# Patient Record
Sex: FEMALE | Race: OTHER
[De-identification: names, ages, dates, MRNs, and addresses within clinical notes are randomized per-mention and may not be internally consistent; named-entity substitution may affect disease eponyms.]

---

## 2019-02-21 ENCOUNTER — HOSPITAL ENCOUNTER (OUTPATIENT)
Dept: HOSPITAL 61 - SURG | Age: 44
Setting detail: OBSERVATION
LOS: 1 days | Discharge: HOME | End: 2019-02-22
Attending: OBSTETRICS & GYNECOLOGY | Admitting: OBSTETRICS & GYNECOLOGY
Payer: COMMERCIAL

## 2019-02-21 VITALS — SYSTOLIC BLOOD PRESSURE: 113 MMHG | DIASTOLIC BLOOD PRESSURE: 62 MMHG

## 2019-02-21 VITALS — WEIGHT: 192 LBS | HEIGHT: 63 IN | BODY MASS INDEX: 34.02 KG/M2

## 2019-02-21 VITALS — DIASTOLIC BLOOD PRESSURE: 57 MMHG | SYSTOLIC BLOOD PRESSURE: 107 MMHG

## 2019-02-21 VITALS — DIASTOLIC BLOOD PRESSURE: 66 MMHG | SYSTOLIC BLOOD PRESSURE: 102 MMHG

## 2019-02-21 VITALS — DIASTOLIC BLOOD PRESSURE: 68 MMHG | SYSTOLIC BLOOD PRESSURE: 110 MMHG

## 2019-02-21 DIAGNOSIS — Z98.890: ICD-10-CM

## 2019-02-21 DIAGNOSIS — N94.10: Primary | ICD-10-CM

## 2019-02-21 DIAGNOSIS — N60.29: ICD-10-CM

## 2019-02-21 DIAGNOSIS — N90.89: ICD-10-CM

## 2019-02-21 LAB
BASOPHILS # BLD AUTO: 0.1 X10^3/UL (ref 0–0.2)
BASOPHILS NFR BLD: 1 % (ref 0–3)
EOSINOPHIL NFR BLD: 0.1 X10^3/UL (ref 0–0.7)
EOSINOPHIL NFR BLD: 1 % (ref 0–3)
ERYTHROCYTE [DISTWIDTH] IN BLOOD BY AUTOMATED COUNT: 14.6 % (ref 11.5–14.5)
HCT VFR BLD CALC: 37.6 % (ref 36–47)
HGB BLD-MCNC: 12.5 G/DL (ref 12–15.5)
LYMPHOCYTES # BLD: 2.4 X10^3/UL (ref 1–4.8)
LYMPHOCYTES NFR BLD AUTO: 33 % (ref 24–48)
MCH RBC QN AUTO: 31 PG (ref 25–35)
MCHC RBC AUTO-ENTMCNC: 33 G/DL (ref 31–37)
MCV RBC AUTO: 93 FL (ref 79–100)
MONO #: 0.5 X10^3/UL (ref 0–1.1)
MONOCYTES NFR BLD: 7 % (ref 0–9)
NEUT #: 4.1 X10^3UL (ref 1.8–7.7)
NEUTROPHILS NFR BLD AUTO: 58 % (ref 31–73)
PLATELET # BLD AUTO: 431 X10^3/UL (ref 140–400)
RBC # BLD AUTO: 4.04 X10^6/UL (ref 3.5–5.4)
U PREG PATIENT: NEGATIVE
WBC # BLD AUTO: 7.1 X10^3/UL (ref 4–11)

## 2019-02-21 PROCEDURE — 56620 VULVECTOMY SIMPLE PARTIAL: CPT

## 2019-02-21 PROCEDURE — G0378 HOSPITAL OBSERVATION PER HR: HCPCS

## 2019-02-21 PROCEDURE — 36415 COLL VENOUS BLD VENIPUNCTURE: CPT

## 2019-02-21 PROCEDURE — G0379 DIRECT REFER HOSPITAL OBSERV: HCPCS

## 2019-02-21 PROCEDURE — 81025 URINE PREGNANCY TEST: CPT

## 2019-02-21 PROCEDURE — 86901 BLOOD TYPING SEROLOGIC RH(D): CPT

## 2019-02-21 PROCEDURE — 85025 COMPLETE CBC W/AUTO DIFF WBC: CPT

## 2019-02-21 PROCEDURE — 86900 BLOOD TYPING SEROLOGIC ABO: CPT

## 2019-02-21 PROCEDURE — A7015 AEROSOL MASK USED W NEBULIZE: HCPCS

## 2019-02-21 PROCEDURE — 86850 RBC ANTIBODY SCREEN: CPT

## 2019-02-21 RX ADMIN — OXYCODONE HYDROCHLORIDE AND ACETAMINOPHEN PRN TAB: 5; 325 TABLET ORAL at 16:09

## 2019-02-21 RX ADMIN — GABAPENTIN SCH MG: 300 CAPSULE ORAL at 14:50

## 2019-02-21 RX ADMIN — GABAPENTIN SCH MG: 300 CAPSULE ORAL at 21:08

## 2019-02-21 RX ADMIN — OXYCODONE HYDROCHLORIDE AND ACETAMINOPHEN PRN TAB: 5; 325 TABLET ORAL at 21:07

## 2019-02-21 NOTE — PDOC
BRIEF OPERATIVE NOTE


Date:  Feb 21, 2019


Pre-Op Diagnosis


1. Dyspareunia


2. Labial skin tag


Post-Op Diagnosis


Same


Procedure Performed


Left Simple Vulvectomy


Surgeon


Dr. Cooper


Anesthesia Type:  General


Blood Loss


20 ml


Specimens Obtained


none


Findings


Left labial skin tag


Complications


none


Operative Note


see dictation











EDWARDO COOPER Jr, MD Feb 21, 2019 11:47

## 2019-02-21 NOTE — OP
DATE OF SURGERY:  02/21/2019



PREOPERATIVE DIAGNOSES:

1.  Dyspareunia.

2.  Labial skin tag.



POSTOPERATIVE DIAGNOSES:

1.  Dyspareunia.

2.  Labial skin tag.



PROCEDURE:  Left simple vulvectomy.



SURGEON:  Dat Cooper MD.



ANESTHESIA:  GETA.



ESTIMATED BLOOD LOSS:  20 mL.



COMPLICATIONS:  None.



FINDINGS:  Left labial skin tag.



SUMMARY:  A 43-year-old female who was complaining of dyspareunia and was found

to have a left labial skin tag.  The patient was counseled on risks, benefits

and expectations of simple vulvectomy for removal of the left labial skin tag. 

She voiced clear understanding to proceed.



DESCRIPTION OF PROCEDURE:  The patient was taken to surgery suite and placed in

dorsal lithotomy position.  She was prepped with Betadine solution and draped in

a sterile fashion.  After adequate anesthesia, the lesion was identified and

demarcated with a marking pen, which an elliptical incision was utilized to

remove the lower left lobe of the labia using 1% lidocaine with epinephrine and

scalpel.  The remaining tissue of the bulbocavernosus muscles were

reapproximated using 0 Vicryl suture in an interrupted fashion.  Two interrupted

sutures were utilized to reapproximate the perineal body.  The skin was

reapproximated using 2-0 Vicryl suture in a subcuticular manner.  Moist vaginal

packing was then placed.  The patient tolerated the procedure well and was taken

to recovery room in stable condition.  Sponge and needle count correct x 3.

 



______________________________

DAT COOPER MD



DR:  LO/mamie  JOB#:  [id number] / [id number]

DD:  02/21/2019 11:47  DT:  02/21/2019 12:11

## 2019-02-22 VITALS — DIASTOLIC BLOOD PRESSURE: 59 MMHG | SYSTOLIC BLOOD PRESSURE: 102 MMHG

## 2019-02-22 VITALS
SYSTOLIC BLOOD PRESSURE: 112 MMHG | DIASTOLIC BLOOD PRESSURE: 68 MMHG | SYSTOLIC BLOOD PRESSURE: 112 MMHG | DIASTOLIC BLOOD PRESSURE: 68 MMHG

## 2019-02-22 VITALS — SYSTOLIC BLOOD PRESSURE: 104 MMHG | DIASTOLIC BLOOD PRESSURE: 62 MMHG

## 2019-02-22 LAB
BASOPHILS # BLD AUTO: 0 X10^3/UL (ref 0–0.2)
BASOPHILS NFR BLD: 0 % (ref 0–3)
EOSINOPHIL NFR BLD: 0 % (ref 0–3)
EOSINOPHIL NFR BLD: 0 X10^3/UL (ref 0–0.7)
ERYTHROCYTE [DISTWIDTH] IN BLOOD BY AUTOMATED COUNT: 14.4 % (ref 11.5–14.5)
HCT VFR BLD CALC: 37.6 % (ref 36–47)
HGB BLD-MCNC: 12.5 G/DL (ref 12–15.5)
LYMPHOCYTES # BLD: 1.5 X10^3/UL (ref 1–4.8)
LYMPHOCYTES NFR BLD AUTO: 14 % (ref 24–48)
MCH RBC QN AUTO: 31 PG (ref 25–35)
MCHC RBC AUTO-ENTMCNC: 33 G/DL (ref 31–37)
MCV RBC AUTO: 94 FL (ref 79–100)
MONO #: 0.4 X10^3/UL (ref 0–1.1)
MONOCYTES NFR BLD: 4 % (ref 0–9)
NEUT #: 9.1 X10^3UL (ref 1.8–7.7)
NEUTROPHILS NFR BLD AUTO: 82 % (ref 31–73)
PLATELET # BLD AUTO: 440 X10^3/UL (ref 140–400)
RBC # BLD AUTO: 4.02 X10^6/UL (ref 3.5–5.4)
WBC # BLD AUTO: 11.1 X10^3/UL (ref 4–11)

## 2019-02-22 RX ADMIN — GABAPENTIN SCH MG: 300 CAPSULE ORAL at 06:01

## 2019-02-22 RX ADMIN — OXYCODONE HYDROCHLORIDE AND ACETAMINOPHEN PRN TAB: 5; 325 TABLET ORAL at 10:20

## 2019-02-22 RX ADMIN — OXYCODONE HYDROCHLORIDE AND ACETAMINOPHEN PRN TAB: 5; 325 TABLET ORAL at 04:36

## 2019-02-22 NOTE — PDOC
SURGICAL PROGRESS NOTE


Subjective


Pt. feeling well.  Pain controlled.  No complaints.


Vital Signs





Vital Signs








  Date Time  Temp Pulse Resp B/P (MAP) Pulse Ox O2 Delivery O2 Flow Rate FiO2


 


2/22/19 05:47 98.4 57 16 102/59 (73) 98 Room Air  





 98.4       


 


2/21/19 16:09       8.0 








I&O











Intake and Output 


 


 2/22/19





 07:00


 


Intake Total 80 ml


 


Output Total 570 ml


 


Balance -490 ml


 


 


 


Intake Oral 80 ml


 


Output Urine Total 550 ml


 


Estimated Blood Loss 20 ml








PATIENT HAS A MEHTA:  No


General:  Alert, Oriented X3, Cooperative


HEENT:  Atraumatic


Lungs:  Clear to auscultation


Heart:  Regular rate


Abdomen:  Normal bowel sounds, Soft, No tenderness, Other (Pelvic: vaginal 

packing removed.  Incision site: clean and intact.)


Extremities:  No edema


Psych/Mental Status:  Mental status NL


Labs





Laboratory Tests








Test


 2/21/19


09:00 2/21/19


09:20 2/22/19


05:30


 


Urine Pregnancy Test Negative (NEG)   


 


White Blood Count


 


 7.1 x10^3/uL


(4.0-11.0) 11.1 x10^3/uL


(4.0-11.0)


 


Red Blood Count


 


 4.04 x10^6/uL


(3.50-5.40) 4.02 x10^6/uL


(3.50-5.40)


 


Hemoglobin


 


 12.5 g/dL


(12.0-15.5) 12.5 g/dL


(12.0-15.5)


 


Hematocrit


 


 37.6 %


(36.0-47.0) 37.6 %


(36.0-47.0)


 


Mean Corpuscular Volume  93 fL ()  94 fL () 


 


Mean Corpuscular Hemoglobin  31 pg (25-35)  31 pg (25-35) 


 


Mean Corpuscular Hemoglobin


Concent 


 33 g/dL


(31-37) 33 g/dL


(31-37)


 


Red Cell Distribution Width


 


 14.6 %


(11.5-14.5) 14.4 %


(11.5-14.5)


 


Platelet Count


 


 431 x10^3/uL


(140-400) 440 x10^3/uL


(140-400)


 


Neutrophils (%) (Auto)  58 % (31-73)  82 % (31-73) 


 


Lymphocytes (%) (Auto)  33 % (24-48)  14 % (24-48) 


 


Monocytes (%) (Auto)  7 % (0-9)  4 % (0-9) 


 


Eosinophils (%) (Auto)  1 % (0-3)  0 % (0-3) 


 


Basophils (%) (Auto)  1 % (0-3)  0 % (0-3) 


 


Neutrophils # (Auto)


 


 4.1 x10^3uL


(1.8-7.7) 9.1 x10^3uL


(1.8-7.7)


 


Lymphocytes # (Auto)


 


 2.4 x10^3/uL


(1.0-4.8) 1.5 x10^3/uL


(1.0-4.8)


 


Monocytes # (Auto)


 


 0.5 x10^3/uL


(0.0-1.1) 0.4 x10^3/uL


(0.0-1.1)


 


Eosinophils # (Auto)


 


 0.1 x10^3/uL


(0.0-0.7) 0.0 x10^3/uL


(0.0-0.7)


 


Basophils # (Auto)


 


 0.1 x10^3/uL


(0.0-0.2) 0.0 x10^3/uL


(0.0-0.2)








Laboratory Tests








Test


 2/21/19


09:00 2/21/19


09:20 2/22/19


05:30


 


Urine Pregnancy Test Negative (NEG)   


 


White Blood Count


 


 7.1 x10^3/uL


(4.0-11.0) 11.1 x10^3/uL


(4.0-11.0)


 


Red Blood Count


 


 4.04 x10^6/uL


(3.50-5.40) 4.02 x10^6/uL


(3.50-5.40)


 


Hemoglobin


 


 12.5 g/dL


(12.0-15.5) 12.5 g/dL


(12.0-15.5)


 


Hematocrit


 


 37.6 %


(36.0-47.0) 37.6 %


(36.0-47.0)


 


Mean Corpuscular Volume  93 fL ()  94 fL () 


 


Mean Corpuscular Hemoglobin  31 pg (25-35)  31 pg (25-35) 


 


Mean Corpuscular Hemoglobin


Concent 


 33 g/dL


(31-37) 33 g/dL


(31-37)


 


Red Cell Distribution Width


 


 14.6 %


(11.5-14.5) 14.4 %


(11.5-14.5)


 


Platelet Count


 


 431 x10^3/uL


(140-400) 440 x10^3/uL


(140-400)


 


Neutrophils (%) (Auto)  58 % (31-73)  82 % (31-73) 


 


Lymphocytes (%) (Auto)  33 % (24-48)  14 % (24-48) 


 


Monocytes (%) (Auto)  7 % (0-9)  4 % (0-9) 


 


Eosinophils (%) (Auto)  1 % (0-3)  0 % (0-3) 


 


Basophils (%) (Auto)  1 % (0-3)  0 % (0-3) 


 


Neutrophils # (Auto)


 


 4.1 x10^3uL


(1.8-7.7) 9.1 x10^3uL


(1.8-7.7)


 


Lymphocytes # (Auto)


 


 2.4 x10^3/uL


(1.0-4.8) 1.5 x10^3/uL


(1.0-4.8)


 


Monocytes # (Auto)


 


 0.5 x10^3/uL


(0.0-1.1) 0.4 x10^3/uL


(0.0-1.1)


 


Eosinophils # (Auto)


 


 0.1 x10^3/uL


(0.0-0.7) 0.0 x10^3/uL


(0.0-0.7)


 


Basophils # (Auto)


 


 0.1 x10^3/uL


(0.0-0.2) 0.0 x10^3/uL


(0.0-0.2)








Assessment/Plan


A: POD#1 s/p Vulvectomy


P: D/c home.











EDWARDO SANTO Jr, MD Feb 22, 2019 08:30

## 2019-02-22 NOTE — DISCH
DISCHARGE INSTRUCTIONS


Condition on Discharge


Condition on Discharge:  Stable





Activity After Discharge


Activity Instructions for Disc:  Activity as tolerated


Lifting Instructions after Dis:  No heavy lifting


Driving Instructions after Dis:  Do not drive today





Diet after Discharge


Diet after Discharge:  Regular





Contacting the [facility] after DC


Call your doctor for:  Concerns you may have





Follow-Up


Follow up with:  Dr. Cooper in 2 weeks











EDWARDO COOPER Jr, MD Feb 22, 2019 08:31

## 2020-10-07 ENCOUNTER — HOSPITAL ENCOUNTER (OUTPATIENT)
Dept: HOSPITAL 61 - US | Age: 45
End: 2020-10-07
Attending: OBSTETRICS & GYNECOLOGY
Payer: COMMERCIAL

## 2020-10-07 DIAGNOSIS — N83.202: ICD-10-CM

## 2020-10-07 DIAGNOSIS — N83.201: Primary | ICD-10-CM

## 2020-10-07 PROCEDURE — 76830 TRANSVAGINAL US NON-OB: CPT

## 2020-10-07 PROCEDURE — 76856 US EXAM PELVIC COMPLETE: CPT

## 2020-10-07 NOTE — RAD
EXAMINATION:  PELVIS W/TV, 10/7/2020 3:30 PM

 

CLINICAL INDICATION:  Bilateral ovarian cysts 

 

TECHNIQUE: Grayscale, color and spectral Doppler ultrasound images of the 

pelvis via transvaginal approach.

 

COMPARISON:  None. 

 

FINDINGS:  

The uterus measures 8.0 5.0 x 4.3 cm. The endometrial stripe measures 12 

mm in thickness. No myometrial mass. Incidental nabothian cysts in the 

cervix.

 

The right ovary measures 2.9 x 1.8 x 1.7 cm. No right ovarian cyst or 

mass. The left ovary measures 2.7 x 1.4 x 1.3 cm. There is a dominant left

ovarian follicle measuring 1 cm. Intact blood flow to both ovaries 

 

No adnexal mass or free fluid.

 

IMPRESSION: Unremarkable pelvic ultrasound.

 

Electronically signed by: Betty Lozada MD (10/7/2020 6:30 PM) [id number]